# Patient Record
Sex: FEMALE | Race: WHITE | NOT HISPANIC OR LATINO | URBAN - METROPOLITAN AREA
[De-identification: names, ages, dates, MRNs, and addresses within clinical notes are randomized per-mention and may not be internally consistent; named-entity substitution may affect disease eponyms.]

---

## 2021-11-17 ENCOUNTER — OFFICE VISIT (OUTPATIENT)
Dept: URBAN - METROPOLITAN AREA CLINIC 12 | Facility: CLINIC | Age: 61
End: 2021-11-17
Payer: COMMERCIAL

## 2021-11-17 DIAGNOSIS — Z12.11 SCREEN FOR COLON CANCER: ICD-10-CM

## 2021-11-17 PROCEDURE — 99202 OFFICE O/P NEW SF 15 MIN: CPT | Performed by: INTERNAL MEDICINE

## 2021-11-17 PROCEDURE — 99242 OFF/OP CONSLTJ NEW/EST SF 20: CPT | Performed by: INTERNAL MEDICINE

## 2021-11-17 RX ORDER — LEVOTHYROXINE SODIUM 112 UG/1
2 TABLET IN THE MORNING ON AN EMPTY STOMACH TABLET ORAL ONCE A DAY
Status: ACTIVE | COMMUNITY

## 2021-11-17 RX ORDER — LIFITEGRAST 50 MG/ML
1 DROP INTO AFFECTED EYE SOLUTION/ DROPS OPHTHALMIC TWICE A DAY
Status: ACTIVE | COMMUNITY

## 2021-11-17 RX ORDER — LISINOPRIL 5 MG/1
1 TABLET TABLET ORAL QHS
Status: ACTIVE | COMMUNITY

## 2021-11-17 RX ORDER — CALCIUM CARBONATE/VITAMIN D3 600MG-5MCG
1 TABLET WITH A MEAL TABLET ORAL ONCE A DAY
Status: ACTIVE | COMMUNITY

## 2021-11-17 RX ORDER — ANTIOX #8/OM3/DHA/EPA/LUT/ZEAX 250-2.5 MG
AS DIRECTED CAPSULE ORAL PRN
Status: ACTIVE | COMMUNITY

## 2021-11-17 RX ORDER — MINERAL OIL, PETROLATUM 425; 573 MG/G; MG/G
BOTH EYES OINTMENT OPHTHALMIC
Status: ACTIVE | COMMUNITY

## 2021-11-17 RX ORDER — KRILL/OM-3/DHA/EPA/PHOSPHO/AST 1000-230MG
2 TABLET CAPSULE ORAL QHS
Status: ACTIVE | COMMUNITY

## 2021-11-17 RX ORDER — CARBOXYMETHYLCELLULOSE SODIUM, GLYCERIN AND POLYSORBATE 80 5; 10; 5 MG/ML; MG/ML; MG/ML
BOTH EYES SOLUTION/ DROPS OPHTHALMIC QID
Status: ACTIVE | COMMUNITY

## 2021-11-17 RX ORDER — LATANOPROST 50 UG/ML
1 DROP INTO AFFECTED EYE IN THE EVENING SOLUTION/ DROPS OPHTHALMIC ONCE A DAY
Status: ACTIVE | COMMUNITY

## 2021-11-17 RX ORDER — SPIRONOLACTONE 25 MG/1
1 TABLET TABLET, FILM COATED ORAL PRN
Status: ACTIVE | COMMUNITY

## 2021-11-17 RX ORDER — APIXABAN 5 MG/1
1 TABLET TABLET, FILM COATED ORAL BID
Status: ACTIVE | COMMUNITY

## 2021-11-17 RX ORDER — FUROSEMIDE 20 MG/1
1 TABLET TABLET ORAL PRN
Status: ACTIVE | COMMUNITY

## 2021-11-17 RX ORDER — DORZOLAMIDE HYDROCHLORIDE AND TIMOLOL MALEATE 20; 5 MG/ML; MG/ML
1 DROP INTO AFFECTED EYE SOLUTION/ DROPS OPHTHALMIC TWICE A DAY
Status: ACTIVE | COMMUNITY

## 2021-11-17 NOTE — HPI-TODAY'S VISIT:
62 yo  female   presenting for evalution for colon cancer screening referred by Dr. lamas  prior colonoscopy-  denies family history of colon cancer or colon polyps  no change in bowel movements, bleeding, abdominal pain, weight loss.     denies prior difficulty with anesthesia   denies abdominal surgeries  had surgery in 2011, had aortic valve replaced and aortic arch.  - Dr. Guillermo Inman with [facility]- sees once a year, had testing in March which she reports was normal.  she has no cp or SOB, walks 3 miles a day 7 days she is taking eliquis because of the surgery- she has a tissue valve

## 2021-11-20 ENCOUNTER — WEB ENCOUNTER (OUTPATIENT)
Dept: URBAN - METROPOLITAN AREA CLINIC 12 | Facility: CLINIC | Age: 61
End: 2021-11-20

## 2021-11-30 ENCOUNTER — WEB ENCOUNTER (OUTPATIENT)
Dept: URBAN - METROPOLITAN AREA CLINIC 12 | Facility: CLINIC | Age: 61
End: 2021-11-30

## 2022-01-31 ENCOUNTER — CLAIMS CREATED FROM THE CLAIM WINDOW (OUTPATIENT)
Dept: URBAN - METROPOLITAN AREA MEDICAL CENTER 27 | Facility: MEDICAL CENTER | Age: 62
End: 2022-01-31

## 2022-01-31 ENCOUNTER — CLAIMS CREATED FROM THE CLAIM WINDOW (OUTPATIENT)
Dept: URBAN - METROPOLITAN AREA MEDICAL CENTER 27 | Facility: MEDICAL CENTER | Age: 62
End: 2022-01-31
Payer: COMMERCIAL

## 2022-01-31 ENCOUNTER — TELEPHONE ENCOUNTER (OUTPATIENT)
Dept: URBAN - METROPOLITAN AREA CLINIC 92 | Facility: CLINIC | Age: 62
End: 2022-01-31

## 2022-01-31 DIAGNOSIS — K62.1 ANAL AND RECTAL POLYP: ICD-10-CM

## 2022-01-31 DIAGNOSIS — K63.5 BENIGN COLON POLYP: ICD-10-CM

## 2022-01-31 DIAGNOSIS — Z12.11 AVERAGE RISK FOR CRC. DUE TO PT'S CO-MORBID STATE WITH END STAGE DEMENTIA, HIGH RISK FOR ANESTHESIA (PER NEUROLOGY); INABILITY TO TAKE A BOWEL PREP....WOULD NOT ADVISE ANY COLORECTAL CANCER SCREENING INCLUDING STOOL TEST FOR FECAL BLOOD.: ICD-10-CM

## 2022-01-31 PROCEDURE — 45380 COLONOSCOPY AND BIOPSY: CPT | Performed by: INTERNAL MEDICINE

## 2022-01-31 RX ORDER — LIFITEGRAST 50 MG/ML
1 DROP INTO AFFECTED EYE SOLUTION/ DROPS OPHTHALMIC TWICE A DAY
Status: ACTIVE | COMMUNITY

## 2022-01-31 RX ORDER — CARBOXYMETHYLCELLULOSE SODIUM, GLYCERIN AND POLYSORBATE 80 5; 10; 5 MG/ML; MG/ML; MG/ML
BOTH EYES SOLUTION/ DROPS OPHTHALMIC QID
Status: ACTIVE | COMMUNITY

## 2022-01-31 RX ORDER — LISINOPRIL 5 MG/1
1 TABLET TABLET ORAL QHS
Status: ACTIVE | COMMUNITY

## 2022-01-31 RX ORDER — LATANOPROST 50 UG/ML
1 DROP INTO AFFECTED EYE IN THE EVENING SOLUTION/ DROPS OPHTHALMIC ONCE A DAY
Status: ACTIVE | COMMUNITY

## 2022-01-31 RX ORDER — ANTIOX #8/OM3/DHA/EPA/LUT/ZEAX 250-2.5 MG
AS DIRECTED CAPSULE ORAL PRN
Status: ACTIVE | COMMUNITY

## 2022-01-31 RX ORDER — LEVOTHYROXINE SODIUM 112 UG/1
2 TABLET IN THE MORNING ON AN EMPTY STOMACH TABLET ORAL ONCE A DAY
Status: ACTIVE | COMMUNITY

## 2022-01-31 RX ORDER — MINERAL OIL, PETROLATUM 425; 573 MG/G; MG/G
BOTH EYES OINTMENT OPHTHALMIC
Status: ACTIVE | COMMUNITY

## 2022-01-31 RX ORDER — CALCIUM CARBONATE/VITAMIN D3 600MG-5MCG
1 TABLET WITH A MEAL TABLET ORAL ONCE A DAY
Status: ACTIVE | COMMUNITY

## 2022-01-31 RX ORDER — FUROSEMIDE 20 MG/1
1 TABLET TABLET ORAL PRN
Status: ACTIVE | COMMUNITY

## 2022-01-31 RX ORDER — KRILL/OM-3/DHA/EPA/PHOSPHO/AST 1000-230MG
2 TABLET CAPSULE ORAL QHS
Status: ACTIVE | COMMUNITY

## 2022-01-31 RX ORDER — APIXABAN 5 MG/1
1 TABLET TABLET, FILM COATED ORAL BID
Status: ACTIVE | COMMUNITY

## 2022-01-31 RX ORDER — DORZOLAMIDE HYDROCHLORIDE AND TIMOLOL MALEATE 20; 5 MG/ML; MG/ML
1 DROP INTO AFFECTED EYE SOLUTION/ DROPS OPHTHALMIC TWICE A DAY
Status: ACTIVE | COMMUNITY

## 2022-01-31 RX ORDER — SPIRONOLACTONE 25 MG/1
1 TABLET TABLET, FILM COATED ORAL PRN
Status: ACTIVE | COMMUNITY

## 2022-02-05 ENCOUNTER — TELEPHONE ENCOUNTER (OUTPATIENT)
Dept: URBAN - METROPOLITAN AREA CLINIC 92 | Facility: CLINIC | Age: 62
End: 2022-02-05

## 2022-02-14 ENCOUNTER — WEB ENCOUNTER (OUTPATIENT)
Dept: URBAN - METROPOLITAN AREA CLINIC 12 | Facility: CLINIC | Age: 62
End: 2022-02-14

## 2022-02-15 ENCOUNTER — TELEPHONE ENCOUNTER (OUTPATIENT)
Dept: URBAN - METROPOLITAN AREA CLINIC 92 | Facility: CLINIC | Age: 62
End: 2022-02-15

## 2022-02-15 ENCOUNTER — LAB OUTSIDE AN ENCOUNTER (OUTPATIENT)
Dept: URBAN - METROPOLITAN AREA CLINIC 23 | Facility: CLINIC | Age: 62
End: 2022-02-15

## 2022-02-15 LAB
HCT: 23.3
HGB: 7.6
PERFORMING LAB: (no result)
PERFORMING LAB: (no result)

## 2022-02-16 ENCOUNTER — OUT OF OFFICE VISIT (OUTPATIENT)
Dept: URBAN - METROPOLITAN AREA MEDICAL CENTER 27 | Facility: MEDICAL CENTER | Age: 62
End: 2022-02-16
Payer: COMMERCIAL

## 2022-02-16 DIAGNOSIS — Z79.01 ANTICOAGULANT LONG-TERM USE: ICD-10-CM

## 2022-02-16 DIAGNOSIS — K55.20 ACQUIRED ARTERIOVENOUS MALFORMATION OF COLON: ICD-10-CM

## 2022-02-16 DIAGNOSIS — K92.1 ACUTE MELENA: ICD-10-CM

## 2022-02-16 DIAGNOSIS — D50.0 ANEMIA: ICD-10-CM

## 2022-02-16 DIAGNOSIS — D62 ABLA (ACUTE BLOOD LOSS ANEMIA): ICD-10-CM

## 2022-02-16 PROCEDURE — 99232 SBSQ HOSP IP/OBS MODERATE 35: CPT | Performed by: INTERNAL MEDICINE

## 2022-02-16 PROCEDURE — G8427 DOCREV CUR MEDS BY ELIG CLIN: HCPCS | Performed by: INTERNAL MEDICINE

## 2022-02-16 PROCEDURE — 99222 1ST HOSP IP/OBS MODERATE 55: CPT | Performed by: INTERNAL MEDICINE

## 2022-02-16 PROCEDURE — 45388 COLONOSCOPY W/ABLATION: CPT | Performed by: INTERNAL MEDICINE

## 2022-02-16 PROCEDURE — 43235 EGD DIAGNOSTIC BRUSH WASH: CPT | Performed by: INTERNAL MEDICINE

## 2022-02-24 ENCOUNTER — TELEPHONE ENCOUNTER (OUTPATIENT)
Dept: URBAN - METROPOLITAN AREA CLINIC 92 | Facility: CLINIC | Age: 62
End: 2022-02-24

## 2022-02-26 LAB
HEMATOCRIT: 31.2
HEMOGLOBIN: 9.5

## 2022-02-28 ENCOUNTER — TELEPHONE ENCOUNTER (OUTPATIENT)
Dept: URBAN - METROPOLITAN AREA CLINIC 92 | Facility: CLINIC | Age: 62
End: 2022-02-28

## 2022-03-02 ENCOUNTER — WEB ENCOUNTER (OUTPATIENT)
Dept: URBAN - METROPOLITAN AREA CLINIC 12 | Facility: CLINIC | Age: 62
End: 2022-03-02

## 2022-03-07 ENCOUNTER — OFFICE VISIT (OUTPATIENT)
Dept: URBAN - METROPOLITAN AREA TELEHEALTH 2 | Facility: TELEHEALTH | Age: 62
End: 2022-03-07
Payer: COMMERCIAL

## 2022-03-07 ENCOUNTER — TELEPHONE ENCOUNTER (OUTPATIENT)
Dept: URBAN - METROPOLITAN AREA CLINIC 92 | Facility: CLINIC | Age: 62
End: 2022-03-07

## 2022-03-07 DIAGNOSIS — Z12.11 SCREEN FOR COLON CANCER: ICD-10-CM

## 2022-03-07 DIAGNOSIS — K92.2 GASTROINTESTINAL HEMORRHAGE, UNSPECIFIED GASTROINTESTINAL HEMORRHAGE TYPE: ICD-10-CM

## 2022-03-07 PROCEDURE — 99212 OFFICE O/P EST SF 10 MIN: CPT | Performed by: INTERNAL MEDICINE

## 2022-03-07 RX ORDER — CALCIUM CARBONATE/VITAMIN D3 600MG-5MCG
1 TABLET WITH A MEAL TABLET ORAL ONCE A DAY
Status: ACTIVE | COMMUNITY

## 2022-03-07 RX ORDER — MINERAL OIL, PETROLATUM 425; 573 MG/G; MG/G
BOTH EYES OINTMENT OPHTHALMIC
Status: ACTIVE | COMMUNITY

## 2022-03-07 RX ORDER — LATANOPROST 50 UG/ML
1 DROP INTO AFFECTED EYE IN THE EVENING SOLUTION/ DROPS OPHTHALMIC ONCE A DAY
Status: ACTIVE | COMMUNITY

## 2022-03-07 RX ORDER — LIFITEGRAST 50 MG/ML
1 DROP INTO AFFECTED EYE SOLUTION/ DROPS OPHTHALMIC TWICE A DAY
Status: ACTIVE | COMMUNITY

## 2022-03-07 RX ORDER — CARBOXYMETHYLCELLULOSE SODIUM, GLYCERIN AND POLYSORBATE 80 5; 10; 5 MG/ML; MG/ML; MG/ML
BOTH EYES SOLUTION/ DROPS OPHTHALMIC QID
Status: ACTIVE | COMMUNITY

## 2022-03-07 RX ORDER — KRILL/OM-3/DHA/EPA/PHOSPHO/AST 1000-230MG
2 TABLET CAPSULE ORAL QHS
Status: ACTIVE | COMMUNITY

## 2022-03-07 RX ORDER — LEVOTHYROXINE SODIUM 112 UG/1
2 TABLET IN THE MORNING ON AN EMPTY STOMACH TABLET ORAL ONCE A DAY
Status: ACTIVE | COMMUNITY

## 2022-03-07 RX ORDER — APIXABAN 5 MG/1
1 TABLET TABLET, FILM COATED ORAL BID
Status: ACTIVE | COMMUNITY

## 2022-03-07 RX ORDER — SPIRONOLACTONE 25 MG/1
1 TABLET TABLET, FILM COATED ORAL PRN
Status: ACTIVE | COMMUNITY

## 2022-03-07 RX ORDER — FUROSEMIDE 20 MG/1
1 TABLET TABLET ORAL PRN
Status: ACTIVE | COMMUNITY

## 2022-03-07 RX ORDER — ANTIOX #8/OM3/DHA/EPA/LUT/ZEAX 250-2.5 MG
AS DIRECTED CAPSULE ORAL PRN
Status: ACTIVE | COMMUNITY

## 2022-03-07 RX ORDER — LISINOPRIL 5 MG/1
1 TABLET TABLET ORAL QHS
Status: ACTIVE | COMMUNITY

## 2022-03-07 RX ORDER — DORZOLAMIDE HYDROCHLORIDE AND TIMOLOL MALEATE 20; 5 MG/ML; MG/ML
1 DROP INTO AFFECTED EYE SOLUTION/ DROPS OPHTHALMIC TWICE A DAY
Status: ACTIVE | COMMUNITY

## 2022-03-07 NOTE — HPI-TODAY'S VISIT:
60 yo  female   presenting for evalution for colon cancer screening referred by Dr. lamas  prior colonoscopy-  denies family history of colon cancer or colon polyps  no change in bowel movements, bleeding, abdominal pain, weight loss.     denies prior difficulty with anesthesia   denies abdominal surgeries  had surgery in 2011, had aortic valve replaced and aortic arch.  - Dr. Guillermo Inman with [facility]- sees once a year, had testing in March which she reports was normal.  she has no cp or SOB, walks 3 miles a day 7 days she is taking eliquis because of the surgery- she has a tissue valve ========================================================== 3/7/2022 she has been doing well since discharge no further bleeding since then back on eliquis she is feeling good from the gi standpoint, bm are back to normal

## 2022-04-05 ENCOUNTER — OFFICE VISIT (OUTPATIENT)
Dept: URBAN - METROPOLITAN AREA SURGERY CENTER 15 | Facility: SURGERY CENTER | Age: 62
End: 2022-04-05

## 2022-09-21 ENCOUNTER — WEB ENCOUNTER (OUTPATIENT)
Dept: URBAN - METROPOLITAN AREA CLINIC 12 | Facility: CLINIC | Age: 62
End: 2022-09-21

## 2022-09-23 ENCOUNTER — WEB ENCOUNTER (OUTPATIENT)
Dept: URBAN - METROPOLITAN AREA CLINIC 12 | Facility: CLINIC | Age: 62
End: 2022-09-23

## 2022-09-23 ENCOUNTER — TELEPHONE ENCOUNTER (OUTPATIENT)
Dept: URBAN - METROPOLITAN AREA CLINIC 111 | Facility: CLINIC | Age: 62
End: 2022-09-23

## 2022-09-23 ENCOUNTER — OFFICE VISIT (OUTPATIENT)
Dept: URBAN - METROPOLITAN AREA CLINIC 111 | Facility: CLINIC | Age: 62
End: 2022-09-23

## 2022-10-05 ENCOUNTER — WEB ENCOUNTER (OUTPATIENT)
Dept: URBAN - METROPOLITAN AREA CLINIC 23 | Facility: CLINIC | Age: 62
End: 2022-10-05

## 2022-10-05 ENCOUNTER — OFFICE VISIT (OUTPATIENT)
Dept: URBAN - METROPOLITAN AREA CLINIC 23 | Facility: CLINIC | Age: 62
End: 2022-10-05
Payer: COMMERCIAL

## 2022-10-05 VITALS
HEART RATE: 61 BPM | BODY MASS INDEX: 23 KG/M2 | TEMPERATURE: 97.7 F | SYSTOLIC BLOOD PRESSURE: 123 MMHG | WEIGHT: 109.6 LBS | DIASTOLIC BLOOD PRESSURE: 73 MMHG | HEIGHT: 58 IN

## 2022-10-05 DIAGNOSIS — Z79.01 CHRONIC ANTICOAGULATION: ICD-10-CM

## 2022-10-05 DIAGNOSIS — K92.2 GASTROINTESTINAL HEMORRHAGE, UNSPECIFIED GASTROINTESTINAL HEMORRHAGE TYPE: ICD-10-CM

## 2022-10-05 DIAGNOSIS — Z12.11 SCREEN FOR COLON CANCER: ICD-10-CM

## 2022-10-05 DIAGNOSIS — D50.9 IRON DEFICIENCY ANEMIA: ICD-10-CM

## 2022-10-05 PROCEDURE — 99214 OFFICE O/P EST MOD 30 MIN: CPT | Performed by: INTERNAL MEDICINE

## 2022-10-05 RX ORDER — ANTIOX #8/OM3/DHA/EPA/LUT/ZEAX 250-2.5 MG
AS DIRECTED CAPSULE ORAL PRN
Status: ACTIVE | COMMUNITY

## 2022-10-05 RX ORDER — LIFITEGRAST 50 MG/ML
1 DROP INTO AFFECTED EYE SOLUTION/ DROPS OPHTHALMIC TWICE A DAY
Status: ACTIVE | COMMUNITY

## 2022-10-05 RX ORDER — SPIRONOLACTONE 25 MG/1
1 TABLET TABLET, FILM COATED ORAL PRN
Status: ACTIVE | COMMUNITY

## 2022-10-05 RX ORDER — LISINOPRIL 5 MG/1
1 TABLET TABLET ORAL QHS
Status: ACTIVE | COMMUNITY

## 2022-10-05 RX ORDER — LATANOPROST 50 UG/ML
1 DROP INTO AFFECTED EYE IN THE EVENING SOLUTION/ DROPS OPHTHALMIC ONCE A DAY
Status: ACTIVE | COMMUNITY

## 2022-10-05 RX ORDER — CARBOXYMETHYLCELLULOSE SODIUM, GLYCERIN AND POLYSORBATE 80 5; 10; 5 MG/ML; MG/ML; MG/ML
BOTH EYES SOLUTION/ DROPS OPHTHALMIC QID
Status: ACTIVE | COMMUNITY

## 2022-10-05 RX ORDER — KRILL/OM-3/DHA/EPA/PHOSPHO/AST 1000-230MG
2 TABLET CAPSULE ORAL QHS
Status: DISCONTINUED | COMMUNITY

## 2022-10-05 RX ORDER — APIXABAN 5 MG/1
1 TABLET TABLET, FILM COATED ORAL BID
Status: DISCONTINUED | COMMUNITY

## 2022-10-05 RX ORDER — FUROSEMIDE 20 MG/1
1 TABLET TABLET ORAL PRN
Status: ACTIVE | COMMUNITY

## 2022-10-05 RX ORDER — MINERAL OIL, PETROLATUM 425; 573 MG/G; MG/G
BOTH EYES OINTMENT OPHTHALMIC
Status: ACTIVE | COMMUNITY

## 2022-10-05 RX ORDER — CALCIUM CARBONATE/VITAMIN D3 600MG-5MCG
1 TABLET WITH A MEAL TABLET ORAL ONCE A DAY
Status: ACTIVE | COMMUNITY

## 2022-10-05 RX ORDER — DORZOLAMIDE HYDROCHLORIDE AND TIMOLOL MALEATE 20; 5 MG/ML; MG/ML
1 DROP INTO AFFECTED EYE SOLUTION/ DROPS OPHTHALMIC TWICE A DAY
Status: ACTIVE | COMMUNITY

## 2022-10-05 RX ORDER — LEVOTHYROXINE SODIUM 112 UG/1
2 TABLET IN THE MORNING ON AN EMPTY STOMACH TABLET ORAL ONCE A DAY
Status: ACTIVE | COMMUNITY

## 2022-10-05 NOTE — HPI-TODAY'S VISIT:
61 yo female presenting for evaluation went to pcp on 9/20 and had blood drawn and hg was 8.6, pcp prescribed iron dosing on 9/23 went to cardiologist Dr Guillermo Inman-was taken off aspirin, xarelto as she doesn't have a mechanial valve and they felt there was no reason to cont the blood thinner and aspirin -immediately started feeling better -she is not seeing chuck bleeding- -she is seeing dark stool since the iron supplement started  -her fatigue has improved, her sob is imroving -was also scheduled for an iron infusion at [place] on October 10 -bowel has been regular, no pain -has been working to lose weight

## 2022-10-06 ENCOUNTER — WEB ENCOUNTER (OUTPATIENT)
Dept: URBAN - METROPOLITAN AREA CLINIC 12 | Facility: CLINIC | Age: 62
End: 2022-10-06

## 2022-10-06 LAB
HEMATOCRIT: 35.3
HEMOGLOBIN: 10.6

## 2022-10-20 PROBLEM — 711150003: Status: ACTIVE | Noted: 2022-10-05

## 2022-10-20 PROBLEM — 87522002 IRON DEFICIENCY ANEMIA: Status: ACTIVE | Noted: 2022-10-05

## 2022-10-21 ENCOUNTER — TELEPHONE ENCOUNTER (OUTPATIENT)
Dept: URBAN - METROPOLITAN AREA CLINIC 23 | Facility: CLINIC | Age: 62
End: 2022-10-21

## 2022-10-26 ENCOUNTER — TELEPHONE ENCOUNTER (OUTPATIENT)
Dept: URBAN - METROPOLITAN AREA CLINIC 12 | Facility: CLINIC | Age: 62
End: 2022-10-26

## 2022-10-26 ENCOUNTER — OFFICE VISIT (OUTPATIENT)
Dept: URBAN - METROPOLITAN AREA CLINIC 11 | Facility: CLINIC | Age: 62
End: 2022-10-26
Payer: COMMERCIAL

## 2022-10-26 DIAGNOSIS — D50.9 ANEMIA: ICD-10-CM

## 2022-10-26 PROCEDURE — 91110 GI TRC IMG INTRAL ESOPH-ILE: CPT | Performed by: INTERNAL MEDICINE

## 2022-10-26 RX ORDER — SPIRONOLACTONE 25 MG/1
1 TABLET TABLET, FILM COATED ORAL PRN
Status: ACTIVE | COMMUNITY

## 2022-10-26 RX ORDER — DORZOLAMIDE HYDROCHLORIDE AND TIMOLOL MALEATE 20; 5 MG/ML; MG/ML
1 DROP INTO AFFECTED EYE SOLUTION/ DROPS OPHTHALMIC TWICE A DAY
Status: ACTIVE | COMMUNITY

## 2022-10-26 RX ORDER — LISINOPRIL 5 MG/1
1 TABLET TABLET ORAL QHS
Status: ACTIVE | COMMUNITY

## 2022-10-26 RX ORDER — CALCIUM CARBONATE/VITAMIN D3 600MG-5MCG
1 TABLET WITH A MEAL TABLET ORAL ONCE A DAY
Status: ACTIVE | COMMUNITY

## 2022-10-26 RX ORDER — FUROSEMIDE 20 MG/1
1 TABLET TABLET ORAL PRN
Status: ACTIVE | COMMUNITY

## 2022-10-26 RX ORDER — ANTIOX #8/OM3/DHA/EPA/LUT/ZEAX 250-2.5 MG
AS DIRECTED CAPSULE ORAL PRN
Status: ACTIVE | COMMUNITY

## 2022-10-26 RX ORDER — CARBOXYMETHYLCELLULOSE SODIUM, GLYCERIN AND POLYSORBATE 80 5; 10; 5 MG/ML; MG/ML; MG/ML
BOTH EYES SOLUTION/ DROPS OPHTHALMIC QID
Status: ACTIVE | COMMUNITY

## 2022-10-26 RX ORDER — MINERAL OIL, PETROLATUM 425; 573 MG/G; MG/G
BOTH EYES OINTMENT OPHTHALMIC
Status: ACTIVE | COMMUNITY

## 2022-10-26 RX ORDER — LEVOTHYROXINE SODIUM 112 UG/1
2 TABLET IN THE MORNING ON AN EMPTY STOMACH TABLET ORAL ONCE A DAY
Status: ACTIVE | COMMUNITY

## 2022-10-26 RX ORDER — LATANOPROST 50 UG/ML
1 DROP INTO AFFECTED EYE IN THE EVENING SOLUTION/ DROPS OPHTHALMIC ONCE A DAY
Status: ACTIVE | COMMUNITY

## 2022-10-26 RX ORDER — LIFITEGRAST 50 MG/ML
1 DROP INTO AFFECTED EYE SOLUTION/ DROPS OPHTHALMIC TWICE A DAY
Status: ACTIVE | COMMUNITY

## 2022-11-14 ENCOUNTER — TELEPHONE ENCOUNTER (OUTPATIENT)
Dept: URBAN - METROPOLITAN AREA CLINIC 6 | Facility: CLINIC | Age: 62
End: 2022-11-14

## 2022-11-30 ENCOUNTER — OFFICE VISIT (OUTPATIENT)
Dept: URBAN - METROPOLITAN AREA CLINIC 12 | Facility: CLINIC | Age: 62
End: 2022-11-30

## 2023-11-20 ENCOUNTER — WEB ENCOUNTER (OUTPATIENT)
Dept: URBAN - METROPOLITAN AREA CLINIC 12 | Facility: CLINIC | Age: 63
End: 2023-11-20

## 2023-12-05 ENCOUNTER — TELEPHONE ENCOUNTER (OUTPATIENT)
Dept: URBAN - METROPOLITAN AREA CLINIC 111 | Facility: CLINIC | Age: 63
End: 2023-12-05

## 2023-12-05 ENCOUNTER — OFFICE VISIT (OUTPATIENT)
Dept: URBAN - METROPOLITAN AREA CLINIC 111 | Facility: CLINIC | Age: 63
End: 2023-12-05
Payer: COMMERCIAL

## 2023-12-05 VITALS
HEIGHT: 58 IN | WEIGHT: 118 LBS | DIASTOLIC BLOOD PRESSURE: 78 MMHG | BODY MASS INDEX: 24.77 KG/M2 | HEART RATE: 69 BPM | TEMPERATURE: 97.7 F | SYSTOLIC BLOOD PRESSURE: 124 MMHG

## 2023-12-05 DIAGNOSIS — D50.9 IRON DEFICIENCY ANEMIA, UNSPECIFIED IRON DEFICIENCY ANEMIA TYPE: ICD-10-CM

## 2023-12-05 PROBLEM — 87522002: Status: ACTIVE | Noted: 2023-12-05

## 2023-12-05 PROCEDURE — 99213 OFFICE O/P EST LOW 20 MIN: CPT | Performed by: NURSE PRACTITIONER

## 2023-12-05 RX ORDER — CARBOXYMETHYLCELLULOSE SODIUM, GLYCERIN AND POLYSORBATE 80 5; 10; 5 MG/ML; MG/ML; MG/ML
BOTH EYES SOLUTION/ DROPS OPHTHALMIC QID
Status: ACTIVE | COMMUNITY

## 2023-12-05 RX ORDER — MINERAL OIL, PETROLATUM 425; 573 MG/G; MG/G
BOTH EYES OINTMENT OPHTHALMIC
Status: ACTIVE | COMMUNITY

## 2023-12-05 RX ORDER — SPIRONOLACTONE 25 MG/1
1 TABLET TABLET, FILM COATED ORAL PRN
Status: ACTIVE | COMMUNITY

## 2023-12-05 RX ORDER — ANTIOX #8/OM3/DHA/EPA/LUT/ZEAX 250-2.5 MG
AS DIRECTED CAPSULE ORAL PRN
Status: ACTIVE | COMMUNITY

## 2023-12-05 RX ORDER — LISINOPRIL 5 MG/1
1 TABLET TABLET ORAL QHS
Status: ACTIVE | COMMUNITY

## 2023-12-05 RX ORDER — LEVOTHYROXINE SODIUM 112 UG/1
2 TABLET IN THE MORNING ON AN EMPTY STOMACH TABLET ORAL ONCE A DAY
Status: ACTIVE | COMMUNITY

## 2023-12-05 RX ORDER — DORZOLAMIDE HYDROCHLORIDE AND TIMOLOL MALEATE 20; 5 MG/ML; MG/ML
1 DROP INTO AFFECTED EYE SOLUTION/ DROPS OPHTHALMIC TWICE A DAY
Status: ACTIVE | COMMUNITY

## 2023-12-05 RX ORDER — LATANOPROST 50 UG/ML
1 DROP INTO AFFECTED EYE IN THE EVENING SOLUTION/ DROPS OPHTHALMIC ONCE A DAY
Status: ACTIVE | COMMUNITY

## 2023-12-05 RX ORDER — CALCIUM CARBONATE/VITAMIN D3 600MG-5MCG
1 TABLET WITH A MEAL TABLET ORAL ONCE A DAY
Status: ACTIVE | COMMUNITY

## 2023-12-05 NOTE — HPI-TODAY'S VISIT:
64 yo female with pmh of NESS presents for follow up. She is seeing a cardiologist for sob, was started on baby aspirin, hbg 10.8 one month after taking aspirin. She stopped baby aspirin since then. She is taking folivan plus daily. Denies any gi symptoms. No melena or hematochezia,. .Patient was last seen by Dr. Medina in October 2022 for hbg 8.6. Her cardiologist Dr Guillermo Inman stopped her aspirin, Xarelto as she doesn't have a mechanical valve, and they felt there was no reason to cont the blood thinner and aspirin. Pill cam on 10/22/22 unremarkable. EGD/Colonoscopy on 2/16/22. EGD [facility], colonoscopy nodule in the ascending bx submucosal vessel and benign polyp.

## 2023-12-08 ENCOUNTER — WEB ENCOUNTER (OUTPATIENT)
Dept: URBAN - METROPOLITAN AREA CLINIC 111 | Facility: CLINIC | Age: 63
End: 2023-12-08

## 2023-12-08 LAB
% SATURATION: 63
FERRITIN: 25
HEMATOCRIT: 39.9
HEMOGLOBIN: 12.4
IRON BINDING CAPACITY: 408
IRON, TOTAL: 259
MCH: 28.6
MCHC: 31.1
MCV: 91.9
MPV: 11.8
PLATELET COUNT: 240
RDW: 13.4
RED BLOOD CELL COUNT: 4.34
WHITE BLOOD CELL COUNT: 5.1

## 2024-01-08 ENCOUNTER — LAB OUTSIDE AN ENCOUNTER (OUTPATIENT)
Dept: URBAN - METROPOLITAN AREA CLINIC 111 | Facility: CLINIC | Age: 64
End: 2024-01-08

## 2024-01-09 ENCOUNTER — WEB ENCOUNTER (OUTPATIENT)
Dept: URBAN - METROPOLITAN AREA CLINIC 111 | Facility: CLINIC | Age: 64
End: 2024-01-09

## 2024-01-09 ENCOUNTER — DASHBOARD ENCOUNTERS (OUTPATIENT)
Age: 64
End: 2024-01-09

## 2024-01-24 ENCOUNTER — LAB OUTSIDE AN ENCOUNTER (OUTPATIENT)
Dept: URBAN - METROPOLITAN AREA CLINIC 111 | Facility: CLINIC | Age: 64
End: 2024-01-24

## 2024-01-24 ENCOUNTER — WEB ENCOUNTER (OUTPATIENT)
Dept: URBAN - METROPOLITAN AREA CLINIC 111 | Facility: CLINIC | Age: 64
End: 2024-01-24

## 2024-01-24 LAB
% SATURATION: 9
FERRITIN: 5
HEMATOCRIT: 43.1
HEMOGLOBIN: 13.3
IRON BINDING CAPACITY: 417
IRON, TOTAL: 39
MCH: 26.8
MCHC: 30.9
MCV: 86.9
MPV: 12.2
PLATELET COUNT: 202
RDW: 13.3
RED BLOOD CELL COUNT: 4.96
WHITE BLOOD CELL COUNT: 4.7

## 2024-01-24 RX ORDER — IRON FUM,PS/FOLIC/BCOMP,C NO.9 125 MG-1MG
1 CAPSULE CAPSULE ORAL ONCE A DAY
Qty: 30 | Refills: 1 | OUTPATIENT
Start: 2024-01-24

## 2024-08-29 ENCOUNTER — LAB OUTSIDE AN ENCOUNTER (OUTPATIENT)
Dept: URBAN - METROPOLITAN AREA CLINIC 12 | Facility: CLINIC | Age: 64
End: 2024-08-29

## 2024-08-29 ENCOUNTER — OFFICE VISIT (OUTPATIENT)
Dept: URBAN - METROPOLITAN AREA CLINIC 12 | Facility: CLINIC | Age: 64
End: 2024-08-29
Payer: COMMERCIAL

## 2024-08-29 VITALS
DIASTOLIC BLOOD PRESSURE: 75 MMHG | WEIGHT: 117 LBS | TEMPERATURE: 97.7 F | SYSTOLIC BLOOD PRESSURE: 135 MMHG | HEART RATE: 80 BPM | BODY MASS INDEX: 24.56 KG/M2 | HEIGHT: 58 IN

## 2024-08-29 DIAGNOSIS — Z95.2 AORTIC VALVE REPLACED: ICD-10-CM

## 2024-08-29 DIAGNOSIS — Q96.9 TURNER SYNDROME: ICD-10-CM

## 2024-08-29 DIAGNOSIS — K92.1 MELENA: ICD-10-CM

## 2024-08-29 PROBLEM — 1231000119100: Status: ACTIVE | Noted: 2024-08-29

## 2024-08-29 PROBLEM — 38804009: Status: ACTIVE | Noted: 2024-08-29

## 2024-08-29 PROCEDURE — 99214 OFFICE O/P EST MOD 30 MIN: CPT | Performed by: STUDENT IN AN ORGANIZED HEALTH CARE EDUCATION/TRAINING PROGRAM

## 2024-08-29 RX ORDER — MINERAL OIL, PETROLATUM 425; 573 MG/G; MG/G
BOTH EYES OINTMENT OPHTHALMIC
Status: ACTIVE | COMMUNITY

## 2024-08-29 RX ORDER — LATANOPROST 50 UG/ML
1 DROP INTO AFFECTED EYE IN THE EVENING SOLUTION/ DROPS OPHTHALMIC ONCE A DAY
Status: ACTIVE | COMMUNITY

## 2024-08-29 RX ORDER — IRON FUM,PS/FOLIC/BCOMP,C NO.9 125 MG-1MG
1 CAPSULE CAPSULE ORAL ONCE A DAY
Qty: 30 | Refills: 1 | Status: ACTIVE | COMMUNITY
Start: 2024-01-24

## 2024-08-29 RX ORDER — CALCIUM CARBONATE/VITAMIN D3 600MG-5MCG
1 TABLET WITH A MEAL TABLET ORAL ONCE A DAY
Status: ACTIVE | COMMUNITY

## 2024-08-29 RX ORDER — CARBOXYMETHYLCELLULOSE SODIUM, GLYCERIN AND POLYSORBATE 80 5; 10; 5 MG/ML; MG/ML; MG/ML
BOTH EYES SOLUTION/ DROPS OPHTHALMIC QID
Status: ACTIVE | COMMUNITY

## 2024-08-29 RX ORDER — SPIRONOLACTONE 25 MG/1
1 TABLET TABLET, FILM COATED ORAL PRN
Status: ACTIVE | COMMUNITY

## 2024-08-29 RX ORDER — DORZOLAMIDE HYDROCHLORIDE AND TIMOLOL MALEATE 20; 5 MG/ML; MG/ML
1 DROP INTO AFFECTED EYE SOLUTION/ DROPS OPHTHALMIC TWICE A DAY
Status: ACTIVE | COMMUNITY

## 2024-08-29 RX ORDER — LISINOPRIL 5 MG/1
1 TABLET TABLET ORAL QHS
Status: ACTIVE | COMMUNITY

## 2024-08-29 RX ORDER — ANTIOX #8/OM3/DHA/EPA/LUT/ZEAX 250-2.5 MG
AS DIRECTED CAPSULE ORAL PRN
Status: ACTIVE | COMMUNITY

## 2024-08-29 RX ORDER — LEVOTHYROXINE SODIUM 112 UG/1
2 TABLET IN THE MORNING ON AN EMPTY STOMACH TABLET ORAL ONCE A DAY
Status: ACTIVE | COMMUNITY

## 2024-08-29 NOTE — HPI-TODAY'S VISIT:
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  Most recent EGD and colonoscopy were 2/2022.  EGD was normal, colonoscopy was notable for 2 cecal angiectasia's that were oozing blood and treated with bipolar probe.  Capsule endoscopy was performed 10/26/2022 which was normal.  Patient reports doing well since that time, with stable labs and normal bowel movements until last week, where she noticed return of black, sticky stools.  She reports having shortness of breath at the end of a 3 mile walk and was concerned that her blood levels might be low.  She reports having her hemoglobin checked few days ago and was over 12, but she is continue to have a few melenic stools per day.  She denies red blood per rectum, hematemesis, abdominal pain.  She continues to take iron supplementation.  She is otherwise feeling well.

## 2024-08-29 NOTE — PREVIOUS WORKUP ENDOSCOPIES
- 2/2022 EGD: normal 2/2022 colonoscopy: 2 angiectasias seen in the cecum treated with bipolar cautery 10/2022 pillcam: normal

## 2024-08-30 ENCOUNTER — TELEPHONE ENCOUNTER (OUTPATIENT)
Dept: URBAN - METROPOLITAN AREA CLINIC 23 | Facility: CLINIC | Age: 64
End: 2024-08-30

## 2024-08-30 ENCOUNTER — WEB ENCOUNTER (OUTPATIENT)
Dept: URBAN - METROPOLITAN AREA CLINIC 12 | Facility: CLINIC | Age: 64
End: 2024-08-30

## 2024-08-30 LAB
ABSOLUTE BASOPHILS: 10
ABSOLUTE EOSINOPHILS: 20
ABSOLUTE LYMPHOCYTES: 1224
ABSOLUTE MONOCYTES: 719
ABSOLUTE NEUTROPHILS: 3126
BASOPHILS: 0.2
EOSINOPHILS: 0.4
FERRITIN, SERUM: 5
HEMATOCRIT: 33.5
HEMOGLOBIN: 10.5
IRON BIND.CAP.(TIBC): 405
IRON SATURATION: 4
IRON: 17
LYMPHOCYTES: 24
MCH: 26.9
MCHC: 31.3
MCV: 85.9
MONOCYTES: 14.1
MPV: 11.9
NEUTROPHILS: 61.3
PLATELET COUNT: 206
RDW: 15
RED BLOOD CELL COUNT: 3.9
WHITE BLOOD CELL COUNT: 5.1

## 2024-08-30 RX ORDER — FERROUS SULFATE 324(65)MG
1 TABLET TABLET, DELAYED RELEASE (ENTERIC COATED) ORAL EVERY OTHER DAY
Qty: 45 | Refills: 3 | OUTPATIENT
Start: 2024-08-30

## 2024-08-31 ENCOUNTER — WEB ENCOUNTER (OUTPATIENT)
Dept: URBAN - METROPOLITAN AREA CLINIC 12 | Facility: CLINIC | Age: 64
End: 2024-08-31

## 2024-09-03 ENCOUNTER — WEB ENCOUNTER (OUTPATIENT)
Dept: URBAN - METROPOLITAN AREA CLINIC 12 | Facility: CLINIC | Age: 64
End: 2024-09-03

## 2024-09-11 ENCOUNTER — CLAIMS CREATED FROM THE CLAIM WINDOW (OUTPATIENT)
Dept: URBAN - METROPOLITAN AREA SURGERY CENTER 8 | Facility: SURGERY CENTER | Age: 64
End: 2024-09-11
Payer: COMMERCIAL

## 2024-09-11 ENCOUNTER — TELEPHONE ENCOUNTER (OUTPATIENT)
Dept: URBAN - METROPOLITAN AREA CLINIC 23 | Facility: CLINIC | Age: 64
End: 2024-09-11

## 2024-09-11 ENCOUNTER — LAB OUTSIDE AN ENCOUNTER (OUTPATIENT)
Dept: URBAN - METROPOLITAN AREA SURGERY CENTER 8 | Facility: SURGERY CENTER | Age: 64
End: 2024-09-11

## 2024-09-11 DIAGNOSIS — D50.9 IRON DEFICIENCY ANEMIA, UNSPECIFIED: ICD-10-CM

## 2024-09-11 DIAGNOSIS — D50.9 IRON DEFICIENCY ANEMIA: ICD-10-CM

## 2024-09-11 DIAGNOSIS — K55.20 ANGIODYSPLASIA OF COLON: ICD-10-CM

## 2024-09-11 PROCEDURE — 45378 DIAGNOSTIC COLONOSCOPY: CPT | Performed by: STUDENT IN AN ORGANIZED HEALTH CARE EDUCATION/TRAINING PROGRAM

## 2024-09-11 PROCEDURE — 43235 EGD DIAGNOSTIC BRUSH WASH: CPT | Performed by: STUDENT IN AN ORGANIZED HEALTH CARE EDUCATION/TRAINING PROGRAM

## 2024-09-11 PROCEDURE — 00813 ANES UPR LWR GI NDSC PX: CPT | Performed by: NURSE ANESTHETIST, CERTIFIED REGISTERED

## 2024-09-11 RX ORDER — CARBOXYMETHYLCELLULOSE SODIUM, GLYCERIN AND POLYSORBATE 80 5; 10; 5 MG/ML; MG/ML; MG/ML
BOTH EYES SOLUTION/ DROPS OPHTHALMIC QID
Status: ACTIVE | COMMUNITY

## 2024-09-11 RX ORDER — MINERAL OIL, PETROLATUM 425; 573 MG/G; MG/G
BOTH EYES OINTMENT OPHTHALMIC
Status: ACTIVE | COMMUNITY

## 2024-09-11 RX ORDER — SPIRONOLACTONE 25 MG/1
1 TABLET TABLET, FILM COATED ORAL PRN
Status: ACTIVE | COMMUNITY

## 2024-09-11 RX ORDER — IRON FUM,PS/FOLIC/BCOMP,C NO.9 125 MG-1MG
1 CAPSULE CAPSULE ORAL ONCE A DAY
Qty: 30 | Refills: 1 | Status: ACTIVE | COMMUNITY
Start: 2024-01-24

## 2024-09-11 RX ORDER — ANTIOX #8/OM3/DHA/EPA/LUT/ZEAX 250-2.5 MG
AS DIRECTED CAPSULE ORAL PRN
Status: ACTIVE | COMMUNITY

## 2024-09-11 RX ORDER — LATANOPROST 50 UG/ML
1 DROP INTO AFFECTED EYE IN THE EVENING SOLUTION/ DROPS OPHTHALMIC ONCE A DAY
Status: ACTIVE | COMMUNITY

## 2024-09-11 RX ORDER — FERROUS SULFATE 324(65)MG
1 TABLET TABLET, DELAYED RELEASE (ENTERIC COATED) ORAL EVERY OTHER DAY
Qty: 45 | Refills: 3 | Status: ACTIVE | COMMUNITY
Start: 2024-08-30

## 2024-09-11 RX ORDER — LISINOPRIL 5 MG/1
1 TABLET TABLET ORAL QHS
Status: ACTIVE | COMMUNITY

## 2024-09-11 RX ORDER — CALCIUM CARBONATE/VITAMIN D3 600MG-5MCG
1 TABLET WITH A MEAL TABLET ORAL ONCE A DAY
Status: ACTIVE | COMMUNITY

## 2024-09-11 RX ORDER — LEVOTHYROXINE SODIUM 112 UG/1
2 TABLET IN THE MORNING ON AN EMPTY STOMACH TABLET ORAL ONCE A DAY
Status: ACTIVE | COMMUNITY

## 2024-09-11 RX ORDER — DORZOLAMIDE HYDROCHLORIDE AND TIMOLOL MALEATE 20; 5 MG/ML; MG/ML
1 DROP INTO AFFECTED EYE SOLUTION/ DROPS OPHTHALMIC TWICE A DAY
Status: ACTIVE | COMMUNITY

## 2024-09-19 ENCOUNTER — WEB ENCOUNTER (OUTPATIENT)
Dept: URBAN - METROPOLITAN AREA CLINIC 5 | Facility: CLINIC | Age: 64
End: 2024-09-19

## 2024-09-19 ENCOUNTER — WEB ENCOUNTER (OUTPATIENT)
Dept: URBAN - METROPOLITAN AREA CLINIC 12 | Facility: CLINIC | Age: 64
End: 2024-09-19

## 2024-09-20 ENCOUNTER — WEB ENCOUNTER (OUTPATIENT)
Dept: URBAN - METROPOLITAN AREA CLINIC 12 | Facility: CLINIC | Age: 64
End: 2024-09-20

## 2024-10-03 ENCOUNTER — OFFICE VISIT (OUTPATIENT)
Dept: URBAN - METROPOLITAN AREA CLINIC 11 | Facility: CLINIC | Age: 64
End: 2024-10-03
Payer: COMMERCIAL

## 2024-10-03 ENCOUNTER — TELEPHONE ENCOUNTER (OUTPATIENT)
Dept: URBAN - METROPOLITAN AREA CLINIC 37 | Facility: CLINIC | Age: 64
End: 2024-10-03

## 2024-10-03 DIAGNOSIS — D50.9 ANEMIA: ICD-10-CM

## 2024-10-03 PROCEDURE — 91110 GI TRC IMG INTRAL ESOPH-ILE: CPT | Performed by: STUDENT IN AN ORGANIZED HEALTH CARE EDUCATION/TRAINING PROGRAM

## 2024-10-03 RX ORDER — DORZOLAMIDE HYDROCHLORIDE AND TIMOLOL MALEATE 20; 5 MG/ML; MG/ML
1 DROP INTO AFFECTED EYE SOLUTION/ DROPS OPHTHALMIC TWICE A DAY
Status: ACTIVE | COMMUNITY

## 2024-10-03 RX ORDER — FERROUS SULFATE 324(65)MG
1 TABLET TABLET, DELAYED RELEASE (ENTERIC COATED) ORAL EVERY OTHER DAY
Qty: 45 | Refills: 3 | Status: ACTIVE | COMMUNITY
Start: 2024-08-30

## 2024-10-03 RX ORDER — MINERAL OIL, PETROLATUM 425; 573 MG/G; MG/G
BOTH EYES OINTMENT OPHTHALMIC
Status: ACTIVE | COMMUNITY

## 2024-10-03 RX ORDER — ANTIOX #8/OM3/DHA/EPA/LUT/ZEAX 250-2.5 MG
AS DIRECTED CAPSULE ORAL PRN
Status: ACTIVE | COMMUNITY

## 2024-10-03 RX ORDER — LISINOPRIL 5 MG/1
1 TABLET TABLET ORAL QHS
Status: ACTIVE | COMMUNITY

## 2024-10-03 RX ORDER — LEVOTHYROXINE SODIUM 112 UG/1
2 TABLET IN THE MORNING ON AN EMPTY STOMACH TABLET ORAL ONCE A DAY
Status: ACTIVE | COMMUNITY

## 2024-10-03 RX ORDER — SPIRONOLACTONE 25 MG/1
1 TABLET TABLET, FILM COATED ORAL PRN
Status: ACTIVE | COMMUNITY

## 2024-10-03 RX ORDER — CARBOXYMETHYLCELLULOSE SODIUM, GLYCERIN AND POLYSORBATE 80 5; 10; 5 MG/ML; MG/ML; MG/ML
BOTH EYES SOLUTION/ DROPS OPHTHALMIC QID
Status: ACTIVE | COMMUNITY

## 2024-10-03 RX ORDER — CALCIUM CARBONATE/VITAMIN D3 600MG-5MCG
1 TABLET WITH A MEAL TABLET ORAL ONCE A DAY
Status: ACTIVE | COMMUNITY

## 2024-10-03 RX ORDER — IRON FUM,PS/FOLIC/BCOMP,C NO.9 125 MG-1MG
1 CAPSULE CAPSULE ORAL ONCE A DAY
Qty: 30 | Refills: 1 | Status: ACTIVE | COMMUNITY
Start: 2024-01-24

## 2024-10-03 RX ORDER — LATANOPROST 50 UG/ML
1 DROP INTO AFFECTED EYE IN THE EVENING SOLUTION/ DROPS OPHTHALMIC ONCE A DAY
Status: ACTIVE | COMMUNITY

## 2024-11-25 ENCOUNTER — OFFICE VISIT (OUTPATIENT)
Dept: URBAN - METROPOLITAN AREA CLINIC 12 | Facility: CLINIC | Age: 64
End: 2024-11-25
Payer: COMMERCIAL

## 2024-11-25 VITALS
SYSTOLIC BLOOD PRESSURE: 116 MMHG | DIASTOLIC BLOOD PRESSURE: 85 MMHG | HEIGHT: 58 IN | BODY MASS INDEX: 24.56 KG/M2 | WEIGHT: 117 LBS | TEMPERATURE: 98.1 F | HEART RATE: 73 BPM

## 2024-11-25 DIAGNOSIS — Q96.9 TURNER SYNDROME: ICD-10-CM

## 2024-11-25 DIAGNOSIS — K31.819 ANGIECTASIA OF GASTROINTESTINAL TRACT: ICD-10-CM

## 2024-11-25 DIAGNOSIS — Z95.2 AORTIC VALVE REPLACED: ICD-10-CM

## 2024-11-25 PROCEDURE — 99214 OFFICE O/P EST MOD 30 MIN: CPT | Performed by: STUDENT IN AN ORGANIZED HEALTH CARE EDUCATION/TRAINING PROGRAM

## 2024-11-25 RX ORDER — CARBOXYMETHYLCELLULOSE SODIUM, GLYCERIN AND POLYSORBATE 80 5; 10; 5 MG/ML; MG/ML; MG/ML
BOTH EYES SOLUTION/ DROPS OPHTHALMIC QID
Status: ACTIVE | COMMUNITY

## 2024-11-25 RX ORDER — SPIRONOLACTONE 25 MG/1
1 TABLET TABLET, FILM COATED ORAL PRN
Status: ACTIVE | COMMUNITY

## 2024-11-25 RX ORDER — IRON FUM,PS/FOLIC/BCOMP,C NO.9 125 MG-1MG
1 CAPSULE CAPSULE ORAL ONCE A DAY
Qty: 30 | Refills: 1 | Status: ACTIVE | COMMUNITY
Start: 2024-01-24

## 2024-11-25 RX ORDER — LATANOPROST 50 UG/ML
1 DROP INTO AFFECTED EYE IN THE EVENING SOLUTION/ DROPS OPHTHALMIC ONCE A DAY
Status: ACTIVE | COMMUNITY

## 2024-11-25 RX ORDER — ANTIOX #8/OM3/DHA/EPA/LUT/ZEAX 250-2.5 MG
AS DIRECTED CAPSULE ORAL PRN
Status: ACTIVE | COMMUNITY

## 2024-11-25 RX ORDER — DORZOLAMIDE HYDROCHLORIDE AND TIMOLOL MALEATE 20; 5 MG/ML; MG/ML
1 DROP INTO AFFECTED EYE SOLUTION/ DROPS OPHTHALMIC TWICE A DAY
Status: ACTIVE | COMMUNITY

## 2024-11-25 RX ORDER — LEVOTHYROXINE SODIUM 112 UG/1
2 TABLET IN THE MORNING ON AN EMPTY STOMACH TABLET ORAL ONCE A DAY
Status: ACTIVE | COMMUNITY

## 2024-11-25 RX ORDER — FERROUS SULFATE 324(65)MG
1 TABLET TABLET, DELAYED RELEASE (ENTERIC COATED) ORAL EVERY OTHER DAY
Qty: 45 | Refills: 3 | Status: ACTIVE | COMMUNITY
Start: 2024-08-30

## 2024-11-25 RX ORDER — LISINOPRIL 5 MG/1
1 TABLET TABLET ORAL QHS
Status: ACTIVE | COMMUNITY

## 2024-11-25 RX ORDER — CALCIUM CARBONATE/VITAMIN D3 600MG-5MCG
1 TABLET WITH A MEAL TABLET ORAL ONCE A DAY
Status: ACTIVE | COMMUNITY

## 2024-11-25 RX ORDER — MINERAL OIL, PETROLATUM 425; 573 MG/G; MG/G
BOTH EYES OINTMENT OPHTHALMIC
Status: ACTIVE | COMMUNITY

## 2024-11-25 NOTE — HPI-TODAY'S VISIT:
- Follow-up visit for gastrointestinal bleeding and anemia. - Reports no current bleeding episodes. - History of significant gastrointestinal bleeding with hemoglobin dropping to 7 in the past, presenting with blood in toilet. - Previously experienced shortness of breath and fatigue when hemoglobin was low. - Currently feeling well with no shortness of breath.  - Recent hemoglobin level of 15, checked last week by hematologist - Iron levels have improved following iron infusion - Previous colonoscopy showed presence of AVMs, though none were actively bleeding at time of examination

## 2024-11-25 NOTE — PREVIOUS WORKUP ENDOSCOPIES
- 2/2022 EGD: normal 2/2022 colonoscopy: 2 angiectasias seen in the cecum treated with bipolar cautery 10/2022 pillcam: normal 2024/09/11 EGD: [id number] colonoscopy: Nonbleeding angiectasia's in the right colon, hemorrhoids, otherwise [id number] video capsule endoscopy: Normal

## 2024-11-25 NOTE — HPI-MIGRATED HPI
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 Most recent EGD and colonoscopy were 2/2022. EGD was normal, colonoscopy was notable for 2 cecal angiectasia's that were oozing blood and treated with bipolar probe. Capsule endoscopy was performed 10/26/2022 which was normal. Patient reports doing well since that time, with stable labs and normal bowel movements until last week, where she noticed return of black, sticky stools. She reports having shortness of breath at the end of a 3 mile walk and was concerned that her blood levels might be low. She reports having her hemoglobin checked few days ago and was over 12, but she is continue to have a few melenic stools per day. She denies red blood per rectum, hematemesis, abdominal pain. She continues to take iron supplementation. She is otherwise feeling well.

## 2024-12-12 ENCOUNTER — OFFICE VISIT (OUTPATIENT)
Dept: URBAN - METROPOLITAN AREA CLINIC 12 | Facility: CLINIC | Age: 64
End: 2024-12-12

## 2025-01-06 ENCOUNTER — OFFICE VISIT (OUTPATIENT)
Dept: URBAN - METROPOLITAN AREA CLINIC 12 | Facility: CLINIC | Age: 65
End: 2025-01-06

## 2025-01-27 ENCOUNTER — OFFICE VISIT (OUTPATIENT)
Dept: URBAN - METROPOLITAN AREA CLINIC 12 | Facility: CLINIC | Age: 65
End: 2025-01-27